# Patient Record
Sex: MALE | URBAN - METROPOLITAN AREA
[De-identification: names, ages, dates, MRNs, and addresses within clinical notes are randomized per-mention and may not be internally consistent; named-entity substitution may affect disease eponyms.]

---
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
## 2025-01-21 DIAGNOSIS — J45.40 MODERATE PERSISTENT ASTHMA WITHOUT COMPLICATION: ICD-10-CM

## 2025-01-21 RX ORDER — ALBUTEROL SULFATE 90 UG/1
2 INHALANT RESPIRATORY (INHALATION)
Qty: 6.7 EACH | Refills: 0 | Status: SHIPPED | OUTPATIENT
Start: 2025-01-21

## 2025-01-21 NOTE — TELEPHONE ENCOUNTER
Future Appointments:  No future appointments.     Last Appointment With Me:  1/22/2024     Requested Prescriptions     Pending Prescriptions Disp Refills    albuterol sulfate HFA (PROVENTIL;VENTOLIN;PROAIR) 108 (90 Base) MCG/ACT inhaler [Pharmacy Med Name: ALBUTEROL HFA (PROVENTIL) INH] 6.7 each 2     Sig: INHALE 2 PUFFS INTO THE LUNGS EVERY 4-6 HOURS AS NEEDED FOR WHEEZING OR SHORTNESS OF BREATH FURTHER REFILLS MUST COME FROM PULMONARY PROVIDER.

## 2025-02-10 DIAGNOSIS — K20.0 EOSINOPHILIC ESOPHAGITIS: ICD-10-CM

## 2025-02-10 NOTE — TELEPHONE ENCOUNTER
Future Appointments:  No future appointments.     Last Appointment With Me:  1/22/2024     Requested Prescriptions     Pending Prescriptions Disp Refills    omeprazole (PRILOSEC) 20 MG delayed release capsule [Pharmacy Med Name: OMEPRAZOLE DR 20 MG CAPSULE] 90 capsule 1     Sig: TAKE 1 CAPSULE BY MOUTH EVERY DAY

## 2025-02-11 DIAGNOSIS — J45.40 MODERATE PERSISTENT ASTHMA WITHOUT COMPLICATION: ICD-10-CM

## 2025-02-11 NOTE — TELEPHONE ENCOUNTER
PCP: Varsha Morrell, APRN - CNP     Last appt:  1/22/2024    No future appointments.       Requested Prescriptions     Pending Prescriptions Disp Refills    albuterol sulfate HFA (PROVENTIL;VENTOLIN;PROAIR) 108 (90 Base) MCG/ACT inhaler [Pharmacy Med Name: ALBUTEROL HFA (PROAIR) INHALER] 8.5 each      Sig: INHALE 2 PUFFS INTO THE LUNGS EVERY 4-6 HOURS AS NEEDED FOR WHEEZING OR SHORTNESS OF BREATH FURTHER REFILLS MUST COME FROM PULMONARY PROVIDER.

## 2025-02-13 DIAGNOSIS — J45.40 MODERATE PERSISTENT ASTHMA WITHOUT COMPLICATION: ICD-10-CM

## 2025-02-13 RX ORDER — ALBUTEROL SULFATE 90 UG/1
2 INHALANT RESPIRATORY (INHALATION)
Qty: 1 EACH | Refills: 0 | Status: SHIPPED | OUTPATIENT
Start: 2025-02-13

## 2025-02-13 NOTE — TELEPHONE ENCOUNTER
Future Appointments:  No future appointments.     Last Appointment With Me:  1/22/2024     Requested Prescriptions     Pending Prescriptions Disp Refills    SPIRIVA RESPIMAT 2.5 MCG/ACT AERS inhaler       Sig: Inhale 2 puffs into the lungs daily    albuterol sulfate HFA (PROVENTIL;VENTOLIN;PROAIR) 108 (90 Base) MCG/ACT inhaler 6.7 each 0     Sig: Inhale 2 puffs into the lungs every 4-6 hours as needed for Wheezing or Shortness of Breath Further refills must come from pulmonary provider.

## 2025-02-13 NOTE — TELEPHONE ENCOUNTER
Caller requests Refill of:    albuterol sulfate HFA (PROVENTIL;VENTOLIN;PROAIR) 108 (90 Base) MCG/ACT inhaler     SPIRIVA RESPIMAT 2.5 MCG/ACT AERS inhaler   Please send to:    [facility]     Visit Appointment History:  Future Appointments:  No future appointments.      Last Appointment With Me:  1/22/2024         Caller confirmed instructions and dosages as correct.    Caller was advised that Meds will be refilled as soon as possible, however there can be a 48-72 business hour turn around on refill requests.

## 2025-02-14 RX ORDER — ALBUTEROL SULFATE 90 UG/1
2 INHALANT RESPIRATORY (INHALATION)
Qty: 1 EACH | Refills: 0 | Status: SHIPPED | OUTPATIENT
Start: 2025-02-14 | End: 2025-03-16

## 2025-02-14 RX ORDER — TIOTROPIUM BROMIDE INHALATION SPRAY 3.12 UG/1
2 SPRAY, METERED RESPIRATORY (INHALATION)
Qty: 1 EACH | Refills: 0 | Status: SHIPPED | OUTPATIENT
Start: 2025-02-14 | End: 2025-03-16

## 2025-04-02 ENCOUNTER — TELEPHONE (OUTPATIENT)
Facility: CLINIC | Age: 20
End: 2025-04-02

## 2025-04-02 NOTE — TELEPHONE ENCOUNTER
Caller requests Refill of:   DULERA 200-5 MCG/ACT inhale  SPIRIVA RESPIMAT 2.5 MCG/ACT AERS inhaler          [facility] to:  [facility]      Visit Appointment History:  Future Appointments:  Future Appointments   Date Time Provider Department Center   5/6/2025  8:40 AM Varsha Morrell, APRN - CNP [facility] DEP         Last Appointment With Me:  1/22/2024         Caller confirmed instructions and dosages as correct.    Caller was advised that Meds will be refilled as soon as possible, however there can be a 48-72 business hour turn around on refill requests.

## 2025-05-06 ENCOUNTER — OFFICE VISIT (OUTPATIENT)
Facility: CLINIC | Age: 20
End: 2025-05-06

## 2025-05-06 VITALS
OXYGEN SATURATION: 93 % | BODY MASS INDEX: 26.81 KG/M2 | HEART RATE: 92 BPM | RESPIRATION RATE: 16 BRPM | TEMPERATURE: 97.4 F | WEIGHT: 142 LBS | SYSTOLIC BLOOD PRESSURE: 88 MMHG | HEIGHT: 61 IN | DIASTOLIC BLOOD PRESSURE: 63 MMHG

## 2025-05-06 DIAGNOSIS — E55.9 VITAMIN D DEFICIENCY: ICD-10-CM

## 2025-05-06 DIAGNOSIS — Z00.00 ROUTINE GENERAL MEDICAL EXAMINATION AT A HEALTH CARE FACILITY: Primary | ICD-10-CM

## 2025-05-06 DIAGNOSIS — Z92.29 HISTORY OF HORMONE THERAPY: ICD-10-CM

## 2025-05-06 DIAGNOSIS — J30.2 SEASONAL ALLERGIES: ICD-10-CM

## 2025-05-06 DIAGNOSIS — E78.2 HYPERLIPIDEMIA, MIXED: ICD-10-CM

## 2025-05-06 DIAGNOSIS — F41.9 ANXIETY AND DEPRESSION: ICD-10-CM

## 2025-05-06 DIAGNOSIS — K20.0 EOSINOPHILIC ESOPHAGITIS: ICD-10-CM

## 2025-05-06 DIAGNOSIS — Z23 NEED FOR DIPHTHERIA-TETANUS-PERTUSSIS (TDAP) VACCINE: ICD-10-CM

## 2025-05-06 DIAGNOSIS — F90.0 ATTENTION DEFICIT HYPERACTIVITY DISORDER (ADHD), PREDOMINANTLY INATTENTIVE TYPE: ICD-10-CM

## 2025-05-06 DIAGNOSIS — F32.A ANXIETY AND DEPRESSION: ICD-10-CM

## 2025-05-06 DIAGNOSIS — Z51.81 MEDICATION MONITORING ENCOUNTER: ICD-10-CM

## 2025-05-06 DIAGNOSIS — J45.21 MILD INTERMITTENT ASTHMA WITH EXACERBATION: ICD-10-CM

## 2025-05-06 DIAGNOSIS — F84.0 AUTISM SPECTRUM DISORDER: ICD-10-CM

## 2025-05-06 DIAGNOSIS — Z29.9 PREVENTIVE MEASURE: ICD-10-CM

## 2025-05-06 PROBLEM — J45.901 ASTHMA EXACERBATION: Status: RESOLVED | Noted: 2024-08-20 | Resolved: 2025-05-06

## 2025-05-06 PROBLEM — K21.9 GASTROESOPHAGEAL REFLUX DISEASE WITHOUT ESOPHAGITIS: Status: ACTIVE | Noted: 2025-05-06

## 2025-05-06 SDOH — ECONOMIC STABILITY: FOOD INSECURITY: WITHIN THE PAST 12 MONTHS, THE FOOD YOU BOUGHT JUST DIDN'T LAST AND YOU DIDN'T HAVE MONEY TO GET MORE.: NEVER TRUE

## 2025-05-06 SDOH — ECONOMIC STABILITY: FOOD INSECURITY: WITHIN THE PAST 12 MONTHS, YOU WORRIED THAT YOUR FOOD WOULD RUN OUT BEFORE YOU GOT MONEY TO BUY MORE.: NEVER TRUE

## 2025-05-06 ASSESSMENT — PATIENT HEALTH QUESTIONNAIRE - PHQ9
10. IF YOU CHECKED OFF ANY PROBLEMS, HOW DIFFICULT HAVE THESE PROBLEMS MADE IT FOR YOU TO DO YOUR WORK, TAKE CARE OF THINGS AT HOME, OR GET ALONG WITH OTHER PEOPLE: SOMEWHAT DIFFICULT
7. TROUBLE CONCENTRATING ON THINGS, SUCH AS READING THE NEWSPAPER OR WATCHING TELEVISION: SEVERAL DAYS
5. POOR APPETITE OR OVEREATING: NOT AT ALL
8. MOVING OR SPEAKING SO SLOWLY THAT OTHER PEOPLE COULD HAVE NOTICED. OR THE OPPOSITE, BEING SO FIGETY OR RESTLESS THAT YOU HAVE BEEN MOVING AROUND A LOT MORE THAN USUAL: NOT AT ALL
SUM OF ALL RESPONSES TO PHQ QUESTIONS 1-9: 6
6. FEELING BAD ABOUT YOURSELF - OR THAT YOU ARE A FAILURE OR HAVE LET YOURSELF OR YOUR FAMILY DOWN: SEVERAL DAYS
4. FEELING TIRED OR HAVING LITTLE ENERGY: MORE THAN HALF THE DAYS
SUM OF ALL RESPONSES TO PHQ QUESTIONS 1-9: 6
2. FEELING DOWN, DEPRESSED OR HOPELESS: SEVERAL DAYS
9. THOUGHTS THAT YOU WOULD BE BETTER OFF DEAD, OR OF HURTING YOURSELF: NOT AT ALL
3. TROUBLE FALLING OR STAYING ASLEEP: SEVERAL DAYS
1. LITTLE INTEREST OR PLEASURE IN DOING THINGS: NOT AT ALL

## 2025-05-06 NOTE — PATIENT INSTRUCTIONS
SunPodst Activation Code.   [id number]     Try montelukast at bedtime over the next month then follow up with me in 4 weeks.  If allergist or pulmonary discontinues the montelukast, you can cancel that visit.

## 2025-05-06 NOTE — PROGRESS NOTES
After verbal order read back of  Varsha Morrell NP, patient received TDAP in Right arm. Boostrix 0.5ml NDC 85678-869-99 lot [id number] exp 07/11/2027. Patient tolerated procedure without complaints and received VIS.

## 2025-05-06 NOTE — PROGRESS NOTES
Edwin Lopez \"Gisel\" is a pleasant 19 y.o.  non binary patient with ADHD, ASD, anxiety, depression, GERD, asthma, and hx of seizure like activity who presents   Chief Complaint   Patient presents with    Annual Exam     1A//        HPI    Edwin Lopez \"Gisel\" is a pleasant 19 y.o. non binary patient who presents for routine annual physical and follow up of chronic conditions.    Aspen's wheezing is significant again today - loud inspiratory and expiratory throughout lung fields.  Follows with pulm in [place]  Will add montelukast today and refer for 2nd opinion with PAR as well as allergist.    Gisel recently is dx'd with EoE (Alaina Aguila PA-C)  Planning EGD June 24    Needs Tdap            Vitals:    05/06/25 0836   BP: (!) 88/63   Pulse: 92   Resp: 16   Temp: 97.4 °F (36.3 °C)   TempSrc: Oral   SpO2: 93%   Weight: 64.4 kg (142 lb)   Height: 1.549 m (5' 1\")        Wt Readings from Last 3 Encounters:   05/06/25 64.4 kg (142 lb) (29%, Z= -0.55)*   01/22/24 65.8 kg (145 lb) (42%, Z= -0.19)*     * Growth percentiles are based on CDC (Boys, 2-20 Years) data.      Body mass index is 26.83 kg/m².     Review of Systems   Constitutional: Negative for headache, fever, and unexpected weight change.  Eyes: Negative for visual changes.  Respiratory: Negative for shortness of breath. +wheezing  Cardiovascular: Negative for chest pain, palpitations, shortness of breath, and leg swelling.  GI/: No changes in bowels or bladder.    Musculoskeletal: No joint pain or swelling.  Skin: No rashes, skin changes  Neurological: Negative for dizziness, weakness, light-headedness.  Hematological: Negative for adenopathy. Does not bruise/bleed easily.   Psychiatric: Negative for anxiety, dysphoria, confusion, sleep disturbance.          5/6/2025     8:41 AM 1/22/2024     1:05 PM   PHQ Scores   PHQ2 Score 1 2   PHQ9 Score 6 2     Interpretation of Total Score Depression Severity: 1-4 = Minimal depression,

## 2025-05-06 NOTE — PROGRESS NOTES
\"Have you been to the ER, urgent care clinic since your last visit?  Hospitalized since your last visit?\"    NO    “Have you seen or consulted any other health care providers outside of [facility] since your last visit?”    YES - When: approximately 1 ago.  Where and Why: Cardiologist.            Click Here for Release of Records Request

## 2025-05-07 LAB
25(OH)D3 SERPL-MCNC: <9 NG/ML (ref 30–100)
BASOPHILS # BLD: 0.05 K/UL (ref 0–0.1)
BASOPHILS NFR BLD: 0.7 % (ref 0–1)
CHOLEST SERPL-MCNC: 196 MG/DL
DIFFERENTIAL METHOD BLD: ABNORMAL
EOSINOPHIL # BLD: 0.77 K/UL (ref 0–0.4)
EOSINOPHIL NFR BLD: 10.6 % (ref 0–7)
ERYTHROCYTE [DISTWIDTH] IN BLOOD BY AUTOMATED COUNT: 15.1 % (ref 11.5–14.5)
HCT VFR BLD AUTO: 42.8 % (ref 36.6–50.3)
HDLC SERPL-MCNC: 42 MG/DL
HDLC SERPL: 4.7 (ref 0–5)
HGB BLD-MCNC: 13.5 G/DL (ref 12.1–17)
IMM GRANULOCYTES # BLD AUTO: 0.01 K/UL (ref 0–0.04)
IMM GRANULOCYTES NFR BLD AUTO: 0.1 % (ref 0–0.5)
LDLC SERPL CALC-MCNC: 141.2 MG/DL (ref 0–100)
LYMPHOCYTES # BLD: 2.62 K/UL (ref 0.8–3.5)
LYMPHOCYTES NFR BLD: 36.1 % (ref 12–49)
MCH RBC QN AUTO: 24.7 PG (ref 26–34)
MCHC RBC AUTO-ENTMCNC: 31.5 G/DL (ref 30–36.5)
MCV RBC AUTO: 78.2 FL (ref 80–99)
MONOCYTES # BLD: 0.48 K/UL (ref 0–1)
MONOCYTES NFR BLD: 6.6 % (ref 5–13)
NEUTS SEG # BLD: 3.32 K/UL (ref 1.8–8)
NEUTS SEG NFR BLD: 45.9 % (ref 32–75)
NRBC # BLD: 0 K/UL (ref 0–0.01)
NRBC BLD-RTO: 0 PER 100 WBC
PLATELET # BLD AUTO: 296 K/UL (ref 150–400)
PMV BLD AUTO: 11 FL (ref 8.9–12.9)
RBC # BLD AUTO: 5.47 M/UL (ref 4.1–5.7)
TRIGL SERPL-MCNC: 64 MG/DL
VLDLC SERPL CALC-MCNC: 12.8 MG/DL
WBC # BLD AUTO: 7.3 K/UL (ref 4.1–11.1)

## 2025-05-11 ENCOUNTER — RESULTS FOLLOW-UP (OUTPATIENT)
Facility: CLINIC | Age: 20
End: 2025-05-11

## 2025-05-11 DIAGNOSIS — E55.9 VITAMIN D DEFICIENCY: Primary | ICD-10-CM

## 2025-05-11 DIAGNOSIS — K20.0 EOSINOPHILIC ESOPHAGITIS: ICD-10-CM

## 2025-05-11 RX ORDER — ERGOCALCIFEROL 1.25 MG/1
50000 CAPSULE, LIQUID FILLED ORAL WEEKLY
Qty: 12 CAPSULE | Refills: 1 | Status: SHIPPED | OUTPATIENT
Start: 2025-05-11

## 2025-05-11 NOTE — RESULT ENCOUNTER NOTE
Please let [facility] know their LDL is higher than we would like.  But it has come down from 177 with the last check to 141.2 this check.  Keep up the good work!  Blood counts are normal. vitamin D is very low.  I will send a prescription for high-dose vitamin D to take weekly for 6 months.  Then start vitamin D 2000 international units daily.

## 2025-05-12 RX ORDER — OMEPRAZOLE 20 MG/1
CAPSULE, DELAYED RELEASE ORAL DAILY
Qty: 90 CAPSULE | Refills: 3 | Status: SHIPPED | OUTPATIENT
Start: 2025-05-12

## 2025-05-12 NOTE — TELEPHONE ENCOUNTER
Future Appointments:  No future appointments.     Last Appointment With Me:  Visit date not found     Requested Prescriptions     Pending Prescriptions Disp Refills    omeprazole (PRILOSEC) 20 MG delayed release capsule [Pharmacy Med Name: OMEPRAZOLE DR 20 MG CAPSULE] 90 capsule 0     Sig: TAKE 1 CAPSULE BY MOUTH EVERY DAY

## 2025-05-15 NOTE — TELEPHONE ENCOUNTER
Future Appointments:  No future appointments.     Last Appointment With Me:  Visit date not found     Requested Prescriptions     Pending Prescriptions Disp Refills    SPIRIVA RESPIMAT 2.5 MCG/ACT AERS inhaler [Pharmacy Med Name: SPIRIVA RESPIMAT 2.5 MCG INH]       Sig: INHALE 2 PUFFS BY MOUTH INTO THE LUNGS DAILY

## 2025-05-17 RX ORDER — TIOTROPIUM BROMIDE INHALATION SPRAY 3.12 UG/1
SPRAY, METERED RESPIRATORY (INHALATION)
Qty: 1 EACH | Refills: 11 | Status: SHIPPED | OUTPATIENT
Start: 2025-05-17

## 2025-06-05 PROBLEM — Z00.00 ROUTINE GENERAL MEDICAL EXAMINATION AT A HEALTH CARE FACILITY: Status: RESOLVED | Noted: 2025-05-06 | Resolved: 2025-06-05

## 2025-06-24 ENCOUNTER — HOSPITAL ENCOUNTER (OUTPATIENT)
Facility: HOSPITAL | Age: 20
Setting detail: OUTPATIENT SURGERY
Discharge: HOME | End: 2025-06-24
Attending: STUDENT IN AN ORGANIZED HEALTH CARE EDUCATION/TRAINING PROGRAM | Admitting: STUDENT IN AN ORGANIZED HEALTH CARE EDUCATION/TRAINING PROGRAM
Payer: MEDICAID

## 2025-06-24 ENCOUNTER — ANESTHESIA EVENT (OUTPATIENT)
Facility: HOSPITAL | Age: 20
End: 2025-06-24
Payer: MEDICAID

## 2025-06-24 ENCOUNTER — ANESTHESIA (OUTPATIENT)
Facility: HOSPITAL | Age: 20
End: 2025-06-24
Payer: MEDICAID

## 2025-06-24 VITALS
DIASTOLIC BLOOD PRESSURE: 72 MMHG | TEMPERATURE: 98 F | OXYGEN SATURATION: 96 % | HEART RATE: 99 BPM | WEIGHT: 142 LBS | BODY MASS INDEX: 26.13 KG/M2 | SYSTOLIC BLOOD PRESSURE: 116 MMHG | HEIGHT: 62 IN | RESPIRATION RATE: 14 BRPM

## 2025-06-24 PROCEDURE — 3600007512: Performed by: STUDENT IN AN ORGANIZED HEALTH CARE EDUCATION/TRAINING PROGRAM

## 2025-06-24 PROCEDURE — 2580000003 HC RX 258: Performed by: STUDENT IN AN ORGANIZED HEALTH CARE EDUCATION/TRAINING PROGRAM

## 2025-06-24 PROCEDURE — 7100000010 HC PHASE II RECOVERY - FIRST 15 MIN: Performed by: STUDENT IN AN ORGANIZED HEALTH CARE EDUCATION/TRAINING PROGRAM

## 2025-06-24 PROCEDURE — 3700000001 HC ADD 15 MINUTES (ANESTHESIA): Performed by: STUDENT IN AN ORGANIZED HEALTH CARE EDUCATION/TRAINING PROGRAM

## 2025-06-24 PROCEDURE — 3600007502: Performed by: STUDENT IN AN ORGANIZED HEALTH CARE EDUCATION/TRAINING PROGRAM

## 2025-06-24 PROCEDURE — 2580000003 HC RX 258

## 2025-06-24 PROCEDURE — 3700000000 HC ANESTHESIA ATTENDED CARE: Performed by: STUDENT IN AN ORGANIZED HEALTH CARE EDUCATION/TRAINING PROGRAM

## 2025-06-24 PROCEDURE — 2709999900 HC NON-CHARGEABLE SUPPLY: Performed by: STUDENT IN AN ORGANIZED HEALTH CARE EDUCATION/TRAINING PROGRAM

## 2025-06-24 PROCEDURE — 2500000003 HC RX 250 WO HCPCS

## 2025-06-24 PROCEDURE — 88305 TISSUE EXAM BY PATHOLOGIST: CPT

## 2025-06-24 PROCEDURE — 6360000002 HC RX W HCPCS

## 2025-06-24 PROCEDURE — 7100000011 HC PHASE II RECOVERY - ADDTL 15 MIN: Performed by: STUDENT IN AN ORGANIZED HEALTH CARE EDUCATION/TRAINING PROGRAM

## 2025-06-24 RX ORDER — SODIUM CHLORIDE 9 MG/ML
25 INJECTION, SOLUTION INTRAVENOUS PRN
Status: DISCONTINUED | OUTPATIENT
Start: 2025-06-24 | End: 2025-06-24 | Stop reason: HOSPADM

## 2025-06-24 RX ORDER — SODIUM CHLORIDE 0.9 % (FLUSH) 0.9 %
5-40 SYRINGE (ML) INJECTION PRN
Status: DISCONTINUED | OUTPATIENT
Start: 2025-06-24 | End: 2025-06-24 | Stop reason: HOSPADM

## 2025-06-24 RX ORDER — SODIUM CHLORIDE 9 MG/ML
INJECTION, SOLUTION INTRAVENOUS
Status: DISCONTINUED | OUTPATIENT
Start: 2025-06-24 | End: 2025-06-24 | Stop reason: SDUPTHER

## 2025-06-24 RX ORDER — DEXMEDETOMIDINE HYDROCHLORIDE 100 UG/ML
INJECTION, SOLUTION INTRAVENOUS
Status: DISCONTINUED | OUTPATIENT
Start: 2025-06-24 | End: 2025-06-24 | Stop reason: SDUPTHER

## 2025-06-24 RX ORDER — LIDOCAINE HYDROCHLORIDE 20 MG/ML
INJECTION, SOLUTION EPIDURAL; INFILTRATION; INTRACAUDAL; PERINEURAL
Status: DISCONTINUED | OUTPATIENT
Start: 2025-06-24 | End: 2025-06-24 | Stop reason: SDUPTHER

## 2025-06-24 RX ORDER — SODIUM CHLORIDE 0.9 % (FLUSH) 0.9 %
5-40 SYRINGE (ML) INJECTION EVERY 12 HOURS SCHEDULED
Status: DISCONTINUED | OUTPATIENT
Start: 2025-06-24 | End: 2025-06-24 | Stop reason: HOSPADM

## 2025-06-24 RX ADMIN — SODIUM CHLORIDE: 9 INJECTION, SOLUTION INTRAVENOUS at 12:37

## 2025-06-24 RX ADMIN — PROPOFOL 50 MG: 10 INJECTION, EMULSION INTRAVENOUS at 12:44

## 2025-06-24 RX ADMIN — PROPOFOL 50 MG: 10 INJECTION, EMULSION INTRAVENOUS at 12:46

## 2025-06-24 RX ADMIN — DEXMEDETOMIDINE 6 MCG: 100 INJECTION, SOLUTION INTRAVENOUS at 12:40

## 2025-06-24 RX ADMIN — PROPOFOL 50 MG: 10 INJECTION, EMULSION INTRAVENOUS at 12:41

## 2025-06-24 RX ADMIN — PROPOFOL 50 MG: 10 INJECTION, EMULSION INTRAVENOUS at 12:42

## 2025-06-24 RX ADMIN — LIDOCAINE HYDROCHLORIDE 100 MG: 20 INJECTION, SOLUTION EPIDURAL; INFILTRATION; INTRACAUDAL; PERINEURAL at 12:40

## 2025-06-24 RX ADMIN — SODIUM CHLORIDE 25 ML: 0.9 INJECTION, SOLUTION INTRAVENOUS at 12:30

## 2025-06-24 RX ADMIN — PROPOFOL 120 MG: 10 INJECTION, EMULSION INTRAVENOUS at 12:40

## 2025-06-24 RX ADMIN — PROPOFOL 60 MG: 10 INJECTION, EMULSION INTRAVENOUS at 12:45

## 2025-06-24 RX ADMIN — PROPOFOL 20 MG: 10 INJECTION, EMULSION INTRAVENOUS at 12:43

## 2025-06-24 ASSESSMENT — PAIN - FUNCTIONAL ASSESSMENT: PAIN_FUNCTIONAL_ASSESSMENT: 0-10

## 2025-06-24 ASSESSMENT — PAIN DESCRIPTION - DESCRIPTORS: DESCRIPTORS: ACHING

## 2025-06-24 NOTE — H&P
Gastroenterology History and Physical    Patient: Edwin Lopez    Physician: Soco Claire MD    Vital Signs: Blood pressure 115/79, pulse 99, resp. rate 24, height 1.575 m (5' 2\"), weight 64.4 kg (142 lb), SpO2 95%.    Allergies: No Known Allergies    Procedure: endoscopy    Indication: history of EoE, dysphagia    History:  Past Medical History:   Diagnosis Date    Acid reflux     ADHD (attention deficit hyperactivity disorder)     Anxiety     Asthma     Asthma exacerbation 08/20/2024    Managed by pulmonology in [place] - unsure name of provider  Compliant with spiriva and Dulera as controllers, albuterol PRN breakthrough      Autism     Depression     Roseanna-Danlos, hypermobile type       Past Surgical History:   Procedure Laterality Date    COLONOSCOPY      UPPER GASTROINTESTINAL ENDOSCOPY  2017      Social History     Socioeconomic History    Marital status: Unknown     Spouse name: None    Number of children: None    Years of education: None    Highest education level: None   Occupational History     Comment: Planning to go to work for [employer]   Tobacco Use    Smoking status: Never    Smokeless tobacco: Never   Vaping Use    Vaping status: Never Used   Substance and Sexual Activity    Alcohol use: Yes     Comment: every other week; no more than 3 shots every other week    Drug use: Yes     Frequency: 7.0 times per week     Types: Marijuana (Weed)    Sexual activity: Never     Social Drivers of Health     Financial Resource Strain: Low Risk  (1/22/2024)    Overall Financial Resource Strain (CARDIA)     Difficulty of Paying Living Expenses: Not hard at all   Food Insecurity: No Food Insecurity (5/6/2025)    Hunger Vital Sign     Worried About Running Out of Food in the Last Year: Never true     Ran Out of Food in the Last Year: Never true   Transportation Needs: No Transportation Needs (5/6/2025)    PRAPARE - Transportation     Lack of Transportation (Medical): No     Lack of Transportation

## 2025-06-24 NOTE — DISCHARGE INSTRUCTIONS
Edwin Lopez  [id number]  2005    EGD DISCHARGE INSTRUCTIONS    CALL M.D. WITH ANY SIGN OF   Increasing pain, nausea, vomiting  Abdominal distension (swelling)  New increased bleeding (oral or rectal)  Fever (chills)  Pain in chest area  Bloody discharge from nose or mouth  Shortness of breath    For 24 hours after general anesthesia or intravenous analgesia / sedation  you may experience:  Drowsiness, dizziness, sleepiness, or confusion  Difficulty remembering or delayed reaction times  Difficulty with your balance, especially while walking, move slowly and carefully, do not make sudden position changes  Difficulty focusing or blurred vision    Discomfort:  Sore throat- throat lozenges or warm salt water gargle  redness at IV site- apply warm compress to area; if redness or soreness persist- contact your physician  Gaseous discomfort- walking, belching will help relieve any discomfort    DIET  You may have minimal sips at this time-- do not eat or drink for two hours.  You may eat and drink after your recovery  You may resume your regular diet - however -  remember your stomach is empty and a heavy meal will produce gas.   Avoid these foods:  vegetables, fried / greasy foods, carbonated drinks    MEDICATIONS:  You may not take any Advil, Aspirin, Ibuprofen, Motrin, Aleve, or Goody’s for 10 days, ONLY  Tylenol as needed for pain.    BLOOD THINNERS:    ACTIVITY  You may resume your normal daily activities until tomorrow AM;  Spend the remainder of the day resting -  avoid any strenuous activity.  You may not operate a vehicle for 12 hours  You may not engage in an occupation involving machinery or appliances for rest of today  You may not drink alcoholic beverages for at least 12 hours  Avoid making any critical decisions for at least 24 hour    IMPRESSION:  Your upper endoscopy appears healthy; I took biopsies to assess the extent of your EoE involvement    Follow-up Instructions:  Results of any

## 2025-06-24 NOTE — OP NOTE
[facility]                   Endoscopic Gastroduodenoscopy Procedure Note      6/24/2025  Edwin Lopez  2005  [id number]    Procedure: Endoscopic Gastroduodenoscopy with biopsy    Indication: history of EoE, dysphagia     Pre-operative Diagnosis: see indication above    Post-operative Diagnosis: see findings below    : Soco Claire MD    Referring Provider:  Varsha Morrell APRN - CNP      Anesthesia/Sedation:  MAC anesthesia Propofol    Airway assessment: No airway problems anticipated    Pre-Procedural Exam:      Airway: clear, no airway problems anticipated  Heart: RRR, without gallops or rubs  Lungs: clear bilaterally without wheezes, crackles, or rhonchi  Abdomen: soft, nontender, nondistended, bowel sounds present  Mental Status: awake, alert and oriented to person, place and time       Procedure Details     After infomed consent was obtained for the procedure, with all risks and benefits of procedure explained the patient was taken to the endoscopy suite and placed in the left lateral decubitus position.  Following sequential administration of sedation as per above, the endoscope was inserted into the mouth and advanced under direct vision to second portion of the duodenum.  A careful inspection was made as the gastroscope was withdrawn, including a retroflexed view of the proximal stomach; findings and interventions are described below.      Findings:   Esophagus: normal esophageal mucosa, biopsies obtained from the distal and proximal esophagus to assess for inflammation.  There was no apparent stricture  Stomach: normal gastric mucosa, biopsies obtained to assess for H pylori.  Retroflexion revealed no abnormalities  Duodenum: normal duodenal mucosa    Therapies:  biopsies    Specimens: Jar 1) gastric biopsies  Jar 2) distal esophagus biopsies  Jar 3) proximal esophagus biopsies           Complications:   None; patient tolerated the procedure

## 2025-06-24 NOTE — ANESTHESIA PRE PROCEDURE
Department of Anesthesiology  Preprocedure Note       Name:  Edwin Lopez   Age:  19 y.o.  :  2005                                          MRN:  [id number]         Date:  2025      Surgeon: Surgeon(s):  Soco Claire MD    Procedure: Procedure(s):  ESOPHAGOGASTRODUODENOSCOPY    Medications prior to admission:   Prior to Admission medications    Medication Sig Start Date End Date Taking? Authorizing Provider   SPIRIVA RESPIMAT 2.5 MCG/ACT AERS inhaler INHALE 2 PUFFS BY MOUTH INTO THE LUNGS DAILY 25   Varsha Morrell APRN - CNP   omeprazole (PRILOSEC) 20 MG delayed release capsule TAKE 1 CAPSULE BY MOUTH EVERY DAY 25   Varsha Morrell APRN - CNP   vitamin D (ERGOCALCIFEROL) 1.25 MG (21057 UT) CAPS capsule Take 1 capsule by mouth once a week 25   Varsha Morrell APRN - CNP   albuterol sulfate HFA (PROVENTIL;VENTOLIN;PROAIR) 108 (90 Base) MCG/ACT inhaler Inhale 2 puffs into the lungs every 4-6 hours as needed for Wheezing or Shortness of Breath Further refills must come from pulmonary provider. 2/14/25 3/16/25  Ade Marley MD   albuterol sulfate HFA (PROVENTIL;VENTOLIN;PROAIR) 108 (90 Base) MCG/ACT inhaler INHALE 2 PUFFS INTO THE LUNGS EVERY 4-6 HOURS AS NEEDED FOR WHEEZING OR SHORTNESS OF BREATH FURTHER REFILLS MUST COME FROM PULMONARY PROVIDER. 25   Ade Marely MD   ARIPiprazole (ABILIFY) 2 MG tablet Take 1 tablet by mouth nightly 23   Shelby Arizmendi MD   budesonide (PULMICORT) 0.5 MG/2ML nebulizer suspension Take 2 mLs by nebulization as needed 23   Shelby Arizmendi MD   DULERA 200-5 MCG/ACT inhaler Inhale 2 puffs into the lungs 2 times daily 23   Shelby Arizmendi MD   hydrOXYzine pamoate (VISTARIL) 25 MG capsule Take 1 capsule by mouth 2 times daily as needed for Anxiety 24   Shelby Arizmendi MD   formoterol (PERFOROMIST) 20 MCG/2ML nebulizer solution Take 2 mLs by nebulization as needed 23   Provider, Historical,

## 2025-06-24 NOTE — ANESTHESIA POSTPROCEDURE EVALUATION
Department of Anesthesiology  Postprocedure Note    Patient: Edwin Lopez  MRN: [id number]  YOB: 2005  Date of evaluation: 6/24/2025    Procedure Summary       Date: 06/24/25 Room / Location: [facility] ENDO 02 / [facility] ENDOSCOPY    Anesthesia Start: 1237 Anesthesia Stop: 1301    Procedure: ESOPHAGOGASTRODUODENOSCOPY (Upper GI Region) Diagnosis:       Esophagitis      Gastroesophageal reflux disease with esophagitis, unspecified whether hemorrhage      (Esophagitis [K20.90])      (Gastroesophageal reflux disease with esophagitis, unspecified whether hemorrhage [K21.00])    Surgeons: Soco Claire MD Responsible Provider: Sean Chavez MD    Anesthesia Type: MAC ASA Status: 2            Anesthesia Type: MAC    Sasha Phase I: Sasha Score: 10    Sasha Phase II: Sasha Score: 10    Anesthesia Post Evaluation    Patient location during evaluation: PACU  Patient participation: complete - patient participated  Level of consciousness: sleepy but conscious and responsive to verbal stimuli  Pain score: 2  Airway patency: patent  Nausea & Vomiting: no vomiting and no nausea  Cardiovascular status: blood pressure returned to baseline and hemodynamically stable  Respiratory status: acceptable  Hydration status: stable  Multimodal analgesia pain management approach  Pain management: adequate    No notable events documented.

## 2025-06-24 NOTE — PROGRESS NOTES
Endoscopy Case End Note:    1246:  Procedure scope was pre-cleaned, per protocol, at bedside by Juma Walton.      1249:  Report received from anesthesia - Shaila MACIEL CRNA.  See anesthesia flowsheet for intra-procedure vital signs and events.

## 2025-06-24 NOTE — PROGRESS NOTES
ARRIVAL INFORMATION:  Verified patient name and date of birth, scheduled procedure, and informed consent.     : lionel Wan, contact number: [phone]  Physician and staff can share information with the .     Receive texts: NO    Belongings with patient include:  Clothing,None    GI FOCUSED ASSESSMENT:  Neuro: Awake, alert, oriented x4  Respiratory: even and unlabored   GI: soft and non-distended  EKG Rhythm: normal sinus rhythm and sinus tachycardia    Education:Reviewed general discharge instructions and  information.

## 2025-07-14 ENCOUNTER — OFFICE VISIT (OUTPATIENT)
Facility: CLINIC | Age: 20
End: 2025-07-14
Payer: MEDICAID

## 2025-07-14 VITALS
WEIGHT: 140.5 LBS | RESPIRATION RATE: 12 BRPM | BODY MASS INDEX: 25.86 KG/M2 | OXYGEN SATURATION: 95 % | SYSTOLIC BLOOD PRESSURE: 93 MMHG | DIASTOLIC BLOOD PRESSURE: 64 MMHG | TEMPERATURE: 98.4 F | HEART RATE: 92 BPM | HEIGHT: 62 IN

## 2025-07-14 DIAGNOSIS — M53.3 SACRAL PAIN: ICD-10-CM

## 2025-07-14 DIAGNOSIS — M54.2 NECK PAIN: ICD-10-CM

## 2025-07-14 DIAGNOSIS — G89.29 CHRONIC BILATERAL THORACIC BACK PAIN: ICD-10-CM

## 2025-07-14 DIAGNOSIS — F84.0 AUTISM SPECTRUM DISORDER: ICD-10-CM

## 2025-07-14 DIAGNOSIS — F32.A ANXIETY AND DEPRESSION: ICD-10-CM

## 2025-07-14 DIAGNOSIS — J45.40 MODERATE PERSISTENT ASTHMA WITHOUT COMPLICATION: ICD-10-CM

## 2025-07-14 DIAGNOSIS — F64.0 GENDER DYSPHORIA IN ADULT: ICD-10-CM

## 2025-07-14 DIAGNOSIS — M54.6 CHRONIC BILATERAL THORACIC BACK PAIN: ICD-10-CM

## 2025-07-14 DIAGNOSIS — F90.0 ATTENTION DEFICIT HYPERACTIVITY DISORDER (ADHD), PREDOMINANTLY INATTENTIVE TYPE: ICD-10-CM

## 2025-07-14 DIAGNOSIS — F41.9 ANXIETY AND DEPRESSION: ICD-10-CM

## 2025-07-14 DIAGNOSIS — Q79.62 HYPERMOBILE EHLERS-DANLOS SYNDROME: Primary | ICD-10-CM

## 2025-07-14 PROCEDURE — 99213 OFFICE O/P EST LOW 20 MIN: CPT | Performed by: NURSE PRACTITIONER

## 2025-07-14 RX ORDER — ARIPIPRAZOLE 5 MG/1
5 TABLET ORAL DAILY
COMMUNITY
Start: 2025-06-01

## 2025-07-14 RX ORDER — ESTRADIOL 0.05 MG/D
1 PATCH, EXTENDED RELEASE TRANSDERMAL
COMMUNITY
Start: 2025-06-04

## 2025-07-14 RX ORDER — OMEPRAZOLE 40 MG/1
CAPSULE, DELAYED RELEASE ORAL
COMMUNITY
Start: 2025-05-05

## 2025-07-14 NOTE — PROGRESS NOTES
Have you been to the ER, urgent care clinic since your last visit?  Hospitalized since your last visit?   NO    Have you seen or consulted any other health care providers outside our system since your last visit?   YES - When: approximately 1 months ago.  Where and Why: GI, endoscopy.

## 2025-07-14 NOTE — PROGRESS NOTES
Edwin Lopez \"Gisel\" is a pleasant 19 y.o.  non binary patient with ADHD, ASD, anxiety, depression, GERD, asthma, and hx of seizure like activity who presents for  Chief Complaint   Patient presents with    Forms       HPI    Gisel presents with their mom for completion of [employer] forms based on:  ASD  Anxiety and panic  EDS  Pain  Form completed today, handed to mom at checkout    Gisel reports sleeping 16 hours a day, intermittent with not sleeping well  Feeling insecure re: gender identity and concerns about what is to come re healthcare passports for gender nonbinary  No SI  They do follow with a mental health counselor, so will discuss with her counselor    For joint pain, will refer to PT      Vitals:    07/14/25 1010   BP: 93/64   BP Site: Left Upper Arm   Patient Position: Sitting   Pulse: 92   Resp: 12   Temp: 98.4 °F (36.9 °C)   TempSrc: Oral   SpO2: 95%   Weight: 63.7 kg (140 lb 8 oz)   Height: 1.575 m (5' 2\")        Wt Readings from Last 3 Encounters:   07/14/25 63.7 kg (140 lb 8 oz) (26%, Z= -0.64)*   06/24/25 64.4 kg (142 lb) (29%, Z= -0.56)*   05/06/25 64.4 kg (142 lb) (29%, Z= -0.55)*     * Growth percentiles are based on CDC (Boys, 2-20 Years) data.      Body mass index is 25.7 kg/m².     Review of Systems   Per HPI          5/6/2025     8:41 AM 1/22/2024     1:05 PM   PHQ Scores   PHQ2 Score 1 2   PHQ9 Score 6 2     Interpretation of Total Score Depression Severity: 1-4 = Minimal depression, 5-9 = Mild depression, 10-14 = Moderate depression, 15-19 = Moderately severe depression, 20-27 = Severe depression          No data to display                 Physical Exam   GENERAL: Patient appears well nourished, well developed.  In no acute distress.  NECK: Appears normal.  Supple.    RESPIRATORY: Normal inspiratory to expiratory ratio.  Normal rate and excursion.   CARDIAC: Regular rate and rhythm.    MUSCULOSKELETAL: Appears grossly normal.  No abnormalities noted.  Upper and lower extremity strength

## (undated) DEVICE — COVIDIEN KENDALL DL DISPOSABLE 3 LEAD SY: Brand: MEDLINE RENEWAL

## (undated) DEVICE — IV START KIT: Brand: MEDLINE

## (undated) DEVICE — BITEBLOCK 54FR W/ DENT RIM BLOX

## (undated) DEVICE — CATHETER IV 22GA L1IN OD0.8382-0.9144MM ID0.6096-0.6858MM 382523

## (undated) DEVICE — CATHETER IV 24GA L0.75IN OD0.6604-0.7366MM

## (undated) DEVICE — CATHETER IV 20GA L1.16IN OD1.0414-1.1176MM ID0.762-0.8382MM

## (undated) DEVICE — CATHETER IV 18GA L1.16IN OD1.27-1.3462MM ID0.9398-1.016MM

## (undated) DEVICE — SET GRAV CK VLV NEEDLESS ST 3 GANGED 4WAY STPCOCK HI FLO 10

## (undated) DEVICE — SENSOR PLSE OXMTR AD CBL L3FT ADH TRANSMISSIVE